# Patient Record
Sex: FEMALE | Race: WHITE | NOT HISPANIC OR LATINO | URBAN - METROPOLITAN AREA
[De-identification: names, ages, dates, MRNs, and addresses within clinical notes are randomized per-mention and may not be internally consistent; named-entity substitution may affect disease eponyms.]

---

## 2021-10-01 ENCOUNTER — OFFICE VISIT (OUTPATIENT)
Dept: PODIATRY | Facility: CLINIC | Age: 66
End: 2021-10-01
Payer: COMMERCIAL

## 2021-10-01 VITALS — WEIGHT: 154 LBS | SYSTOLIC BLOOD PRESSURE: 140 MMHG | HEART RATE: 62 BPM | DIASTOLIC BLOOD PRESSURE: 80 MMHG

## 2021-10-01 DIAGNOSIS — T25.022A BURN OF LEFT FOOT, UNSPECIFIED BURN DEGREE, INITIAL ENCOUNTER: Primary | ICD-10-CM

## 2021-10-01 DIAGNOSIS — M21.622 TAILOR'S BUNION OF LEFT FOOT: ICD-10-CM

## 2021-10-01 PROCEDURE — 99203 OFFICE O/P NEW LOW 30 MIN: CPT | Performed by: PODIATRIST

## 2021-10-01 RX ORDER — ESTRADIOL 2 MG/1
2 TABLET ORAL DAILY
COMMUNITY
Start: 2021-07-30

## 2021-10-01 RX ORDER — ALBUTEROL SULFATE 90 UG/1
AEROSOL, METERED RESPIRATORY (INHALATION)
COMMUNITY
Start: 2021-07-31

## 2021-10-01 NOTE — PATIENT INSTRUCTIONS
We wish you continued good healing. If you have any questions or concerns, please do not hesitate to contact us at [phone]    [employer] (secure e-mail communication and access to your chart) to send a message or to make an appointment.    Please remember to call and schedule a follow up appointment if one was recommended at your earliest convenience.     +++OF MARCH 2020+++ LOCATION AND HOURS HAVE CHANGED    PLEASE CALL CLINICS TO VERIFY DAYS AND TIMES  PODIATRY CLINIC HOURS  TELEPHONE NUMBER    Dr. Miky NELSONPNACHO [facility]        Clinics:  [facility]   Tuesday 1PM-6PM  [place]  Wednesday 745AM-330PM  [place]  Thursday/Friday 745AM-230PM  [place] [facility] APPOINTMENTS  [phone]    [place] APPOINTMENTS  [phone]          If you need a medication refill, please contact us you may need lab work and/or a follow up visit prior to your refill (i.e. Antifungal medications).    If MRI needed please call Imaging at [phone] or [phone]    HOW DO I GET MY KNEE SCOOTER? Knee scooters can be picked up at ANY Medical Supply stores with your knee scooter Prescription.  OR    Bring your signed prescription to an [facility] showroom.

## 2021-10-01 NOTE — LETTER
10/1/2021         RE: Teresa Villarreal  [address]  [place] [address]        Dear Colleague,    Thank you for referring your patient, Teresa Villarreal, to the [facility]. Please see a copy of my visit note below.    Subjective:    Pt is seen today as a new pt referral with the c/c of painful leftfoot.  This has been symptomatic recently.   Patient points to later fifth metatarsal head.    Describes this as a burning pain.  Aggravated by activity and releaved by rest.  Denies ecchymosis edema or erythema.  Also recently dropped hot ceramic on the dorsum of left fourth interspace towards sulcus.  Patient did this on 9/10.  It is now a dry eschar.  Denies drainage or odor.    ROS: See above         Allergies   Allergen Reactions     Azithromycin Other (See Comments)     diarrhea       Current Outpatient Medications   Medication Sig Dispense Refill     albuterol (PROAIR HFA/PROVENTIL HFA/VENTOLIN HFA) 108 (90 Base) MCG/ACT inhaler Inhale 1-2 Puffs every 4 hours as needed for Wheezing       estradiol (ESTRACE) 2 MG tablet Take 2 mg by mouth daily         There is no problem list on file for this patient.      No past medical history on file.    No past surgical history on file.    No family history on file.    Social History     Tobacco Use     Smoking status: Never Smoker     Smokeless tobacco: Never Used   Substance Use Topics     Alcohol use: Never         Exam:    Vitals: BP (!) 140/80   Pulse 62   Wt 69.9 kg (154 lb)   BMI: There is no height or weight on file to calculate BMI.  Height: Data Unavailable    Constitutional/ general:  Pt is in no apparent distress, appears well-nourished.  Cooperative with history and physical exam.     Psych:  The patient answered questions appropriately.  Normal affect.  Seems to have reasonable expectations, in terms of treatment.     Lymphatic:  Popliteal lymph nodes not enlarged.     Lungs:  Non labored breathing, non labored speech. No cough.  No  audible wheezing. Even, quiet breathing.       Vascular:  positive pedal pulses bilaterally for both the DP and PT arteries.  CFT < 3 sec.  negative ankle edema.  positive pedal hair growth.    Neuro:  Alert and oriented x 3. Coordinated gait.  Light touch sensation is intact     Derm: Normal texture and turgor.  No erythema, ecchymosis, or cyanosis.  Small dry eschar noted left fourth interspace dorsal sulcus.  No drainage.  No erythema or pain on palpation.    Musculoskeletal:     Patient is ambulatory without an assistive device or brace.  Slightly pronated arch with weightbearing.   MS 5/5 all compartments.  Normal ROM all forefoot and rearfoot joints.  No equinus.  Mobile forefoot.  Is quite wide with weightbearing.  Pain lateral fifth MTP J.  No pain plantar or dorsal.  No pain with range of motion.  No bursa        A/P leftTailor's bunion         Left foot thermal injury    Discussed with patient burn is healing nicely.  Will just continue to watch this and keep it protected.  Discussed cause of bunionette with patient.  Will wear wide shoes with no seams over this area.  Discussed stretching out shoes in this area.  Discussed releasing shoes to offload this.  Discussed wide running shoe which would accommodate foot well.  RETURN TO CLINIC PRN.    Miky Pascal DPM, FACFAS          Again, thank you for allowing me to participate in the care of your patient.        Sincerely,        Miky Pascal DPM

## 2021-10-01 NOTE — PROGRESS NOTES
Subjective:    Pt is seen today as a new pt referral with the c/c of painful leftfoot.  This has been symptomatic recently.   Patient points to later fifth metatarsal head.    Describes this as a burning pain.  Aggravated by activity and releaved by rest.  Denies ecchymosis edema or erythema.  Also recently dropped hot ceramic on the dorsum of left fourth interspace towards sulcus.  Patient did this on 9/10.  It is now a dry eschar.  Denies drainage or odor.    ROS: See above         Allergies   Allergen Reactions     Azithromycin Other (See Comments)     diarrhea       Current Outpatient Medications   Medication Sig Dispense Refill     albuterol (PROAIR HFA/PROVENTIL HFA/VENTOLIN HFA) 108 (90 Base) MCG/ACT inhaler Inhale 1-2 Puffs every 4 hours as needed for Wheezing       estradiol (ESTRACE) 2 MG tablet Take 2 mg by mouth daily         There is no problem list on file for this patient.      No past medical history on file.    No past surgical history on file.    No family history on file.    Social History     Tobacco Use     Smoking status: Never Smoker     Smokeless tobacco: Never Used   Substance Use Topics     Alcohol use: Never         Exam:    Vitals: BP (!) 140/80   Pulse 62   Wt 69.9 kg (154 lb)   BMI: There is no height or weight on file to calculate BMI.  Height: Data Unavailable    Constitutional/ general:  Pt is in no apparent distress, appears well-nourished.  Cooperative with history and physical exam.     Psych:  The patient answered questions appropriately.  Normal affect.  Seems to have reasonable expectations, in terms of treatment.     Lymphatic:  Popliteal lymph nodes not enlarged.     Lungs:  Non labored breathing, non labored speech. No cough.  No audible wheezing. Even, quiet breathing.       Vascular:  positive pedal pulses bilaterally for both the DP and PT arteries.  CFT < 3 sec.  negative ankle edema.  positive pedal hair growth.    Neuro:  Alert and oriented x 3. Coordinated gait.   Light touch sensation is intact     Derm: Normal texture and turgor.  No erythema, ecchymosis, or cyanosis.  Small dry eschar noted left fourth interspace dorsal sulcus.  No drainage.  No erythema or pain on palpation.    Musculoskeletal:     Patient is ambulatory without an assistive device or brace.  Slightly pronated arch with weightbearing.   MS 5/5 all compartments.  Normal ROM all forefoot and rearfoot joints.  No equinus.  Mobile forefoot.  Is quite wide with weightbearing.  Pain lateral fifth MTP J.  No pain plantar or dorsal.  No pain with range of motion.  No bursa        A/P leftTailor's bunion         Left foot thermal injury    Discussed with patient burn is healing nicely.  Will just continue to watch this and keep it protected.  Discussed cause of bunionette with patient.  Will wear wide shoes with no seams over this area.  Discussed stretching out shoes in this area.  Discussed releasing shoes to offload this.  Discussed wide running shoe which would accommodate foot well.  RETURN TO CLINIC PRN.    Miky Pascal DPM, FACFAS

## 2022-05-19 LAB — HEMOCCULT STL QL IA: NEGATIVE

## 2022-07-21 LAB — AST SERPL-CCNC: 19 U/L (ref 10–40)

## 2022-08-01 LAB — GLUCOSE (EXTERNAL): 101 MG/DL (ref 70–100)

## 2023-06-22 ENCOUNTER — THERAPY VISIT (OUTPATIENT)
Dept: PHYSICAL THERAPY | Facility: CLINIC | Age: 68
End: 2023-06-22
Payer: COMMERCIAL

## 2023-06-22 DIAGNOSIS — M25.562 BILATERAL KNEE PAIN: Primary | ICD-10-CM

## 2023-06-22 DIAGNOSIS — M25.561 BILATERAL KNEE PAIN: Primary | ICD-10-CM

## 2023-06-22 PROCEDURE — 97530 THERAPEUTIC ACTIVITIES: CPT | Mod: GP

## 2023-06-22 PROCEDURE — 97161 PT EVAL LOW COMPLEX 20 MIN: CPT | Mod: GP

## 2023-06-22 PROCEDURE — 97110 THERAPEUTIC EXERCISES: CPT | Mod: 59

## 2023-06-22 ASSESSMENT — ACTIVITIES OF DAILY LIVING (ADL)
SWELLING: I HAVE THE SYMPTOM BUT IT DOES NOT AFFECT MY ACTIVITY
STAND: ACTIVITY IS MINIMALLY DIFFICULT
AS_A_RESULT_OF_YOUR_KNEE_INJURY,_HOW_WOULD_YOU_RATE_YOUR_CURRENT_LEVEL_OF_DAILY_ACTIVITY?: NEARLY NORMAL
LIMPING: I DO NOT HAVE THE SYMPTOM
WALK: ACTIVITY IS NOT DIFFICULT
SIT WITH YOUR KNEE BENT: ACTIVITY IS NOT DIFFICULT
RISE FROM A CHAIR: ACTIVITY IS NOT DIFFICULT
KNEE_ACTIVITY_OF_DAILY_LIVING_SCORE: 90
HOW_WOULD_YOU_RATE_THE_OVERALL_FUNCTION_OF_YOUR_KNEE_DURING_YOUR_USUAL_DAILY_ACTIVITIES?: NEARLY NORMAL
GO DOWN STAIRS: ACTIVITY IS NOT DIFFICULT
RAW_SCORE: 63
GO UP STAIRS: ACTIVITY IS NOT DIFFICULT
KNEE_ACTIVITY_OF_DAILY_LIVING_SUM: 63
PAIN: THE SYMPTOM AFFECTS MY ACTIVITY SLIGHTLY
SQUAT: ACTIVITY IS MINIMALLY DIFFICULT
KNEEL ON THE FRONT OF YOUR KNEE: ACTIVITY IS MINIMALLY DIFFICULT
GIVING WAY, BUCKLING OR SHIFTING OF KNEE: I DO NOT HAVE THE SYMPTOM
HOW_WOULD_YOU_RATE_THE_CURRENT_FUNCTION_OF_YOUR_KNEE_DURING_YOUR_USUAL_DAILY_ACTIVITIES_ON_A_SCALE_FROM_0_TO_100_WITH_100_BEING_YOUR_LEVEL_OF_KNEE_FUNCTION_PRIOR_TO_YOUR_INJURY_AND_0_BEING_THE_INABILITY_TO_PERFORM_ANY_OF_YOUR_USUAL_DAILY_ACTIVITIES?: 90
WEAKNESS: I DO NOT HAVE THE SYMPTOM
STIFFNESS: I HAVE THE SYMPTOM BUT IT DOES NOT AFFECT MY ACTIVITY

## 2023-06-22 NOTE — PROGRESS NOTES
PHYSICAL THERAPY EVALUATION  Type of Visit: Evaluation    See electronic medical record for Abuse and Falls Screening details.    Subjective     Pt presents with B knee pain. Says they started to hurt last June, R was more bothersome than L. Has had Xray, MRI last October, was told she has OA and no cartilage left in her knee. Was told to only stationary bike and walk on a TM. In May she changed her diet to eliminate inflammatory foods, has lost weight. About 10 days ago ran about 3 miles and then felt terrible after (had run a few times previous to this), had been a runner for years. Would like to learn her limits.   Presenting condition or subjective complaint:    Date of onset: 05/01/23 (approximate date)    Relevant medical history:   asthma, hearing problems, OA  Dates & types of surgery:      Prior diagnostic imaging/testing results:       Prior therapy history for the same diagnosis, illness or injury:        Prior Level of Function   Transfers: Independent  Ambulation: Independent  ADL: Independent  IADL: Independent    Living Environment  Social support:   lives alone  Type of home:   apartment/condo  Stairs to enter the home:       yes, 2 w/ railing  Ramp:  no   Stairs inside the home:      Yes, 11 w/ railing   Help at home:  none  Equipment owned:       Employment:    none  Hobbies/Interests:  exercises, languagues, reading, movies    Patient goals for therapy:  walk/run 5 miles    Pain assessment: See objective evaluation for additional pain details     Objective   KNEE EVALUATION  PAIN: Pain Level at Rest: 0/10  Pain Level with Use: 6/10  Pain Location: anterior knees  Pain Quality: Dull  Pain Frequency: intermittent  Pain is Exacerbated By: running, standing for multiple hours  Pain is Relieved By: cold, NSAIDs, rest and stretch  Pain Progression: worsened in the last year, but improved in the last week after running  INTEGUMENTARY (edema, incisions):   POSTURE:   GAIT:  Weightbearing Status:    Assistive Device(s):   Gait Deviations:   BALANCE/PROPRIOCEPTION:   WEIGHTBEARING ALIGNMENT:   NON-WEIGHTBEARING ALIGNMENT:   ROM:   Hip ROM: WNL  Knee ROM:  (Degrees) Left AROM Left PROM  Right AROM Right PROM   Knee Flexion 125  125    Knee Extension 0  0    Pain: no pain at end ranges  End feel:     STRENGTH:    Lower Extremity Muscle Strength   Left Right   Hip Flex 5/5 5/5   Hip Ext 5-/5 5-/5   Hip ER 5/5 5/5   Hip IR 5/5 5/5   Hip ABD 4+/5 4/5   Hip ADD /5 /5   Knee Ext 5/5 5/5   Knee Flex 5/5 5/5     FLEXIBILITY: mild MURPHY hamstring tightness  SPECIAL TESTS: negative patellar compression  FUNCTIONAL TESTS: Double Leg Squat: weight shifted R, anterior knee pain MURPHY  PALPATION: TTP R knee inferior patellar facet, otherwise no TTP  JOINT MOBILITY:     Assessment & Plan   CLINICAL IMPRESSIONS   Medical Diagnosis: self referred    Treatment Diagnosis: Bilateral knee pain   Impression/Assessment: Patient is a 68 year old female with B knee complaints.  The following significant findings have been identified: Pain, Decreased ROM/flexibility, Decreased joint mobility, Decreased strength and Impaired balance. These impairments interfere with their ability to perform self care tasks, work tasks, recreational activities, household chores, household mobility and community mobility as compared to previous level of function.     Clinical Decision Making (Complexity):   Clinical Presentation: Stable/Uncomplicated  Clinical Presentation Rationale: based on medical and personal factors listed in PT evaluation  Clinical Decision Making (Complexity): Low complexity    PLAN OF CARE  Treatment Interventions:  Interventions: Manual Therapy, Neuromuscular Re-education, Therapeutic Activity, Therapeutic Exercise, Self-Care/Home Management    Long Term Goals     PT Goal 1  Goal Identifier: Walking  Goal Description: Pt will be able to walk unrestricted distances w/o increased knee pain  Rationale: to maximize safety and independence  with performance of ADLs and functional tasks;to maximize safety and independence within the home;to maximize safety and independence within the community  Target Date: 09/14/23      Frequency of Treatment: 1x/week  Duration of Treatment: 4 weeks tapering to 2x/month for 8 weeks    Recommended Referrals to Other Professionals: none  Education Assessment:        Risks and benefits of evaluation/treatment have been explained.   Patient/Family/caregiver agrees with Plan of Care.     Evaluation Time:            Signing Clinician: Vicki Waterman PT      [facility]                                                                                   OUTPATIENT PHYSICAL THERAPY      PLAN OF TREATMENT FOR OUTPATIENT REHABILITATION   Patient's Last Name, First Name, Teresa Maynard YOB: 1955   Provider's Name   [facility]   Medical Record No.  [id number]     Onset Date: 05/01/23 (approximate date)  Start of Care Date: 06/22/23     Medical Diagnosis:  self referred      PT Treatment Diagnosis:  Bilateral knee pain Plan of Treatment  Frequency/Duration: 1x/week/ 4 weeks tapering to 2x/month for 8 weeks    Certification date from 06/22/23 to 09/14/23         See note for plan of treatment details and functional goals     Vicki Waterman PT                         I CERTIFY THE NEED FOR THESE SERVICES FURNISHED UNDER        THIS PLAN OF TREATMENT AND WHILE UNDER MY CARE .             Physician Signature               Date    X_____________________________________________________                        Referring Provider:  Referred Self      Initial Assessment  See Epic Evaluation- Start of Care Date: 06/22/23

## 2023-07-12 ENCOUNTER — THERAPY VISIT (OUTPATIENT)
Dept: PHYSICAL THERAPY | Facility: CLINIC | Age: 68
End: 2023-07-12
Payer: COMMERCIAL

## 2023-07-12 DIAGNOSIS — M25.562 BILATERAL KNEE PAIN: Primary | ICD-10-CM

## 2023-07-12 DIAGNOSIS — M25.561 BILATERAL KNEE PAIN: Primary | ICD-10-CM

## 2023-07-12 PROCEDURE — 97110 THERAPEUTIC EXERCISES: CPT | Mod: 59 | Performed by: PHYSICAL THERAPIST

## 2023-07-12 PROCEDURE — 97140 MANUAL THERAPY 1/> REGIONS: CPT | Mod: 59 | Performed by: PHYSICAL THERAPIST

## 2023-07-12 PROCEDURE — 97530 THERAPEUTIC ACTIVITIES: CPT | Mod: GP | Performed by: PHYSICAL THERAPIST

## 2023-07-21 ENCOUNTER — TRANSFERRED RECORDS (OUTPATIENT)
Dept: MULTI SPECIALTY CLINIC | Facility: CLINIC | Age: 68
End: 2023-07-21

## 2023-07-21 LAB
ALT SERPL-CCNC: 17 U/L
CHOLESTEROL (EXTERNAL): 158 MG/DL (ref 0–199)
CREATININE (EXTERNAL): 0.8 MG/DL (ref 0.55–1.02)
GFR ESTIMATED (EXTERNAL): >60 ML/MIN/1.73M2
HDLC SERPL-MCNC: 56 MG/DL
HEMOGLOBIN (EXTERNAL): 5.8 G/DL
LDL CHOLESTEROL CALCULATED (EXTERNAL): 91 MG/DL
NON HDL CHOLESTEROL (EXTERNAL): 102 MG/DL
POTASSIUM (EXTERNAL): 4.9 MMOL/L (ref 3.5–5.1)
TRIGLYCERIDES (EXTERNAL): 54 MG/DL
TSH SERPL-ACNC: 2.03 UIU/ML (ref 0.3–4.5)

## 2023-08-08 ENCOUNTER — THERAPY VISIT (OUTPATIENT)
Dept: PHYSICAL THERAPY | Facility: CLINIC | Age: 68
End: 2023-08-08
Payer: COMMERCIAL

## 2023-08-08 DIAGNOSIS — M25.562 BILATERAL KNEE PAIN: Primary | ICD-10-CM

## 2023-08-08 DIAGNOSIS — M25.561 BILATERAL KNEE PAIN: Primary | ICD-10-CM

## 2023-08-08 PROCEDURE — 97530 THERAPEUTIC ACTIVITIES: CPT | Mod: GP | Performed by: PHYSICAL THERAPIST

## 2023-08-08 PROCEDURE — 97110 THERAPEUTIC EXERCISES: CPT | Mod: 59 | Performed by: PHYSICAL THERAPIST

## 2023-09-04 ENCOUNTER — OFFICE VISIT (OUTPATIENT)
Dept: FAMILY MEDICINE | Facility: CLINIC | Age: 68
End: 2023-09-04
Payer: COMMERCIAL

## 2023-09-04 VITALS
WEIGHT: 142 LBS | OXYGEN SATURATION: 99 % | DIASTOLIC BLOOD PRESSURE: 70 MMHG | RESPIRATION RATE: 16 BRPM | HEART RATE: 62 BPM | SYSTOLIC BLOOD PRESSURE: 111 MMHG | TEMPERATURE: 97.9 F

## 2023-09-04 DIAGNOSIS — U07.1 INFECTION DUE TO 2019 NOVEL CORONAVIRUS: Primary | ICD-10-CM

## 2023-09-04 PROCEDURE — 99204 OFFICE O/P NEW MOD 45 MIN: CPT | Performed by: FAMILY MEDICINE

## 2023-09-04 NOTE — PROGRESS NOTES
Assessment:       Infection due to 2019 novel coronavirus  - nirmatrelvir and ritonavir (PAXLOVID) 300 mg/100 mg therapy pack  Dispense: 30 tablet; Refill: 0         Plan:     Patient with positive COVID-19 test.  Patient high risk complications of COVID-19 because of age.  Will treat with Paxlovid.  No interactions reviewed.  Kidney function reviewed.  All questions answered.    MEDICATIONS:   Orders Placed This Encounter   Medications    nirmatrelvir and ritonavir (PAXLOVID) 300 mg/100 mg therapy pack     Sig: Take 3 tablets by mouth 2 times daily for 5 days (Take 2 Nirmatrelvir tablets and 1 Ritonavir tablet twice daily for 5 days)     Dispense:  30 tablet     Refill:  0     Date of symptom onset: 2; Risk criteria met: Yes; Weight >40 kg Yes; Renal fxn: normal;  Drug-Drug interactions reviewed & addressed: Yes       Subjective:       68 year old female presents for evaluation due to history of cough and low-grade fever.  She tested positive for COVID-19 at home tonight.  She is interested in treatment.  She is otherwise healthy and only takes estradiol.  Kidney function reviewed and was normal in July 2023.  Shortness of breath or wheezing.  No weakness or lightheadedness.    Patient Active Problem List   Diagnosis    Bilateral knee pain       No past medical history on file.    No past surgical history on file.    Current Outpatient Medications   Medication    albuterol (PROAIR HFA/PROVENTIL HFA/VENTOLIN HFA) 108 (90 Base) MCG/ACT inhaler    estradiol (ESTRACE) 2 MG tablet     No current facility-administered medications for this visit.       Allergies   Allergen Reactions    Azithromycin Other (See Comments)     diarrhea       No family history on file.    Social History     Socioeconomic History    Marital status: Single   Tobacco Use    Smoking status: Never    Smokeless tobacco: Never   Substance and Sexual Activity    Alcohol use: Never         Review of Systems  Pertinent items are noted in HPI.       Objective:     There were no vitals taken for this visit.     General appearance: alert, appears stated age, and cooperative  Lungs: clear to auscultation bilaterally  Heart: Regular rate and rhythm          This note has been dictated using voice recognition software. Any grammatical or context distortions are unintentional and inherent to the software

## 2023-10-03 PROBLEM — E87.5 HYPERKALEMIA: Status: ACTIVE | Noted: 2023-08-08

## 2023-10-03 PROBLEM — R73.03 PREDIABETES: Status: ACTIVE | Noted: 2023-08-08

## 2023-10-03 PROBLEM — N62 HYPERTROPHY OF BREAST: Status: ACTIVE | Noted: 2023-08-08

## 2023-10-03 NOTE — PATIENT INSTRUCTIONS
Get the tetanus vaccine at the pharmacy.      Patient Education   Personalized Prevention Plan  You are due for the preventive services outlined below.  Your care team is available to assist you in scheduling these services.  If you have already completed any of these items, please share that information with your care team to update in your medical record.  Health Maintenance Due   Topic Date Due    Osteoporosis Screening  Never done    Discuss Advance Care Planning  Never done    Colorectal Cancer Screening  Never done    Hepatitis C Screening  Never done    Diptheria Tetanus Pertussis (DTAP/TDAP/TD) Vaccine (1 - Tdap) Never done    Cholesterol Lab  Never done    Zoster (Shingles) Vaccine (1 of 2) Never done    Annual Wellness Visit  Never done    FALL RISK ASSESSMENT  Never done    Pneumococcal Vaccine (2 - PCV) 11/12/2022    PHQ-2 (once per calendar year)  Never done    Flu Vaccine (1) 09/01/2023    COVID-19 Vaccine (6 - 2023-24 season) 09/01/2023

## 2023-10-03 NOTE — PROGRESS NOTES
Assessment & Plan     Encounter for Medicare annual wellness exam    Immunizations: Had COVID a month ago, so recommended waiting until three months after the illness for the next booster.  Flu and pneumonia vaccines done today.  Insurance doesn't cover Shingrix - she is considering getting a different plan next year.    Lab Studies: labs recently done over the summer  Mammogram: done in July  Colonoscopy/FIT: FIT ordered  DEXA: declined      Transgender    Teresa saw her previous endocrinologist in August and current dosing of estrogen was continued.  She is s/p orchiectomy so not on any testosterone blockers.  She will follow-up on the hormone therapy next summer; has enough refills until then.  She would like to consider another attempt at breast augmentation (previously had complication with infection and implants were removed); referral placed.      - Comprehensive Gender Care Referral; Future    Special screening for malignant neoplasms, colon    - Fecal colorectal cancer screen (FIT); Future      Benjamin Powers MD  [facility] INTERNAL MEDICINE [place]    Subjective   Teresa is a 68 year old, presenting for the following health issues:  Establish Care (Had COVID about a month ago wants to know when she should get vaccine, discuss breast augmentation )    HPI     Teresa has been on the same dose of estrogen for gender affirmation for about 20 years.  She is s/p orchiectomy.  She would like to get a second opinion on breast augmentation- had augmentation in the past and developed infection after a dental procedure done after the augmentation and the implants were removed and she has been using prosthetics since then, but is contemplating trying augmentation again.      She has a history of prediabetes with A1C of 5.8 and has been doing intermittent fasting to help manage this.          Annual Wellness Visit    Patient has been advised of split billing requirements and indicates understanding: Yes      Are you in the first 12 months of your Medicare Part B coverage?  No    Physical Health questionnaire given to patient but was not able to be found after her appointment, so she may have accidentally taken this home.     Today's PHQ-2 Score:       10/4/2023     3:14 PM   PHQ-2 (  [employer])   Q1: Little interest or pleasure in doing things 0   Q2: Feeling down, depressed or hopeless 0   PHQ-2 Score 0       Have you ever done Advance Care Planning? (For example, a Health Directive, POLST, or a discussion with a medical provider or your loved ones about your wishes)? Deferred to future visit    Fall risk:  Fallen 2 or more times in the past year?: Yes  Any fall with injury in the past year?: No    Cognitive Screenin) Repeat 3 items (Leader, Season, Table)    2) Clock draw: NORMAL  3) 3 item recall: Recalls 3 objects  Results: 3 items recalled: COGNITIVE IMPAIRMENT LESS LIKELY    Mini-CogTM Copyright MEG Barlow. Licensed by the author for use in [facility] [employer]; reprinted with permission (akosua@[facility]). All rights reserved.        Past Medical History:   Diagnosis Date    Bilateral knee pain 2023    Hyperkalemia 2023    Hypertrophy of breast 2023    Prediabetes 2023       Past Surgical History:   Procedure Laterality Date    ORCHIECTOMY SCROTAL Bilateral        Family History   Problem Relation Age of Onset    Breast Cancer Mother     Dementia Mother     Prostate Cancer Father     Crohn's Disease Father     Congenital heart disease Father     Kidney Disease Father         Social History     Tobacco Use    Smoking status: Never    Smokeless tobacco: Never   Substance Use Topics    Alcohol use: Never              No data to display              Do you have a current opioid prescription? No  Do you use any other controlled substances or medications that are not prescribed by a provider? None    Current providers sharing in care for this patient include:   Patient Care Team:  No  "Ref-Primary, Physician as PCP - General    The following health maintenance items are reviewed in Epic and correct as of today:  Health Maintenance   Topic Date Due    DEXA  Never done    ADVANCE CARE PLANNING  Never done    COLORECTAL CANCER SCREENING  Never done    DTAP/TDAP/TD IMMUNIZATION (1 - Tdap) Never done    LIPID  Never done    ZOSTER IMMUNIZATION (1 of 2) Never done    MEDICARE ANNUAL WELLNESS VISIT  Never done    Pneumococcal Vaccine: 65+ Years (2 - PCV) 11/12/2022    INFLUENZA VACCINE (1) 09/01/2023    COVID-19 Vaccine (6 - 2023-24 season) 09/01/2023    FALL RISK ASSESSMENT  10/04/2024    MAMMO SCREENING  07/21/2025    PHQ-2 (once per calendar year)  Completed    IPV IMMUNIZATION  Aged Out    HPV IMMUNIZATION  Aged Out    MENINGITIS IMMUNIZATION  Aged Out    HEPATITIS C SCREENING  Discontinued       Patient has been advised of split billing requirements and indicates understanding: Yes    Appropriate preventive services were discussed with this patient, including applicable screening as appropriate for fall prevention, nutrition, physical activity, Tobacco-use cessation, weight loss and cognition.  Checklist reviewing preventive services available has been given to the patient.      Review of Systems   Constitutional, endo systems are negative, except as otherwise noted.      Objective    /69 (BP Location: Right arm, Patient Position: Sitting, Cuff Size: Adult Regular)   Pulse 67   Ht 1.778 m (5' 10\")   Wt 65.6 kg (144 lb 11.2 oz)   SpO2 97%   BMI 20.76 kg/m    Body mass index is 20.76 kg/m .  Physical Exam     GENERAL: Healthy, alert and no distress  EYES: Eyes grossly normal to inspection.  No discharge or erythema, or obvious scleral/conjunctival abnormalities.  RESP: No audible wheeze, cough, or visible cyanosis.  No visible retractions or increased work of breathing.    SKIN: Visible skin clear. No significant rash, abnormal pigmentation or lesions.  NEURO: Cranial nerves grossly intact. "  Mentation and speech appropriate for age.  PSYCH: Mentation appears normal, affect normal/bright, judgement and insight intact, normal speech and appearance well-groomed.

## 2023-10-04 ENCOUNTER — LAB (OUTPATIENT)
Dept: LAB | Facility: CLINIC | Age: 68
End: 2023-10-04
Payer: COMMERCIAL

## 2023-10-04 ENCOUNTER — OFFICE VISIT (OUTPATIENT)
Dept: INTERNAL MEDICINE | Facility: CLINIC | Age: 68
End: 2023-10-04
Payer: COMMERCIAL

## 2023-10-04 VITALS
DIASTOLIC BLOOD PRESSURE: 69 MMHG | SYSTOLIC BLOOD PRESSURE: 110 MMHG | BODY MASS INDEX: 20.71 KG/M2 | HEIGHT: 70 IN | HEART RATE: 67 BPM | OXYGEN SATURATION: 97 % | WEIGHT: 144.7 LBS

## 2023-10-04 DIAGNOSIS — Z00.00 ENCOUNTER FOR MEDICARE ANNUAL WELLNESS EXAM: Primary | ICD-10-CM

## 2023-10-04 DIAGNOSIS — Z12.11 SPECIAL SCREENING FOR MALIGNANT NEOPLASMS, COLON: ICD-10-CM

## 2023-10-04 DIAGNOSIS — Z78.9 TRANSGENDER: ICD-10-CM

## 2023-10-04 PROCEDURE — 82274 ASSAY TEST FOR BLOOD FECAL: CPT | Performed by: INTERNAL MEDICINE

## 2023-10-04 PROCEDURE — 90662 IIV NO PRSV INCREASED AG IM: CPT | Performed by: INTERNAL MEDICINE

## 2023-10-04 PROCEDURE — 90677 PCV20 VACCINE IM: CPT | Performed by: INTERNAL MEDICINE

## 2023-10-04 PROCEDURE — G0009 ADMIN PNEUMOCOCCAL VACCINE: HCPCS | Performed by: INTERNAL MEDICINE

## 2023-10-04 PROCEDURE — 99213 OFFICE O/P EST LOW 20 MIN: CPT | Mod: 25 | Performed by: INTERNAL MEDICINE

## 2023-10-04 PROCEDURE — G0439 PPPS, SUBSEQ VISIT: HCPCS | Performed by: INTERNAL MEDICINE

## 2023-10-04 PROCEDURE — G0008 ADMIN INFLUENZA VIRUS VAC: HCPCS | Performed by: INTERNAL MEDICINE

## 2023-10-04 PROCEDURE — 99000 SPECIMEN HANDLING OFFICE-LAB: CPT | Performed by: PATHOLOGY

## 2023-10-04 RX ORDER — MULTIPLE VITAMINS W/ MINERALS TAB 9MG-400MCG
1 TAB ORAL DAILY
COMMUNITY

## 2023-10-04 NOTE — NURSING NOTE
Teresa Villarreal is a 68 year old female patient that presents today in clinic for the following:    Chief Complaint   Patient presents with    Establish Care     Had COVID about a month ago wants to know when she should get vaccine, discuss breast augmentation      The patient's allergies and medications were reviewed as noted. A set of vitals were recorded as noted without incident. The patient does not have any other questions for the provider.    Briana Carmichael, EMT at 3:13 PM on 10/4/2023

## 2023-10-05 ENCOUNTER — TELEPHONE (OUTPATIENT)
Dept: PLASTIC SURGERY | Facility: CLINIC | Age: 68
End: 2023-10-05
Payer: COMMERCIAL

## 2023-10-05 NOTE — CONFIDENTIAL NOTE
Writer LAYNE re: referral for CGC. Pt referred for breast augmentation.     Pt called back to discuss referral for breast augmentation. Pt asked for CPT codes so writer gave these. Writer also discussed how to ask insurance about coverage and LOS. Pt stated their insurance doesn't cover psychological services, so writer referred her to [facility] for their sliding scale option. Pt said she was expecting to pay out of pocket, but will look into her insurance plan. Pt to call back to schedule consult.

## 2023-10-07 LAB — HEMOCCULT STL QL IA: NEGATIVE

## 2023-10-10 ENCOUNTER — TELEPHONE (OUTPATIENT)
Dept: PLASTIC SURGERY | Facility: CLINIC | Age: 68
End: 2023-10-10
Payer: COMMERCIAL

## 2023-10-12 ENCOUNTER — DOCUMENTATION ONLY (OUTPATIENT)
Dept: OTHER | Facility: CLINIC | Age: 68
End: 2023-10-12
Payer: COMMERCIAL

## 2024-03-07 ENCOUNTER — DOCUMENTATION ONLY (OUTPATIENT)
Dept: OTHER | Facility: CLINIC | Age: 69
End: 2024-03-07
Payer: COMMERCIAL

## 2024-03-25 ENCOUNTER — TELEPHONE (OUTPATIENT)
Dept: INTERNAL MEDICINE | Facility: CLINIC | Age: 69
End: 2024-03-25
Payer: COMMERCIAL

## 2024-03-25 NOTE — TELEPHONE ENCOUNTER
Left Voicemail (1st Attempt) for the patient to call back and schedule the following:      Additonal Notes: resched 10/4

## 2024-03-26 ENCOUNTER — TELEPHONE (OUTPATIENT)
Dept: INTERNAL MEDICINE | Facility: CLINIC | Age: 69
End: 2024-03-26
Payer: COMMERCIAL

## 2024-03-26 NOTE — TELEPHONE ENCOUNTER
[facility] Call Center    Phone Message    May a detailed message be left on voicemail: yes     Reason for Call: Other: Patient is wondering if she could get an order for mammogram as well as lipid panel before rescheduled appt in August.     Action Taken: Message routed to:  [facility]    Travel Screening: Not Applicable

## 2024-03-28 NOTE — TELEPHONE ENCOUNTER
RN called and spoke to the patient.    The patient states that recently she called the [facility] to schedule an annual physical exam with Dr. Powers (scheduled in August). At that time, the patient was wondering about orders for mammogram and lipid panel as her endocrinologist in the past has recommended these be performed annually.     The RN explained that the patient can discuss needs for routine screening with Dr. Powers at her upcoming appointment and Dr. Powers can place orders at that time. The patient was in agreement with the plan. RN also sent a [employer] activation link to the patient's e mail per patient request.

## 2024-05-12 ENCOUNTER — HEALTH MAINTENANCE LETTER (OUTPATIENT)
Age: 69
End: 2024-05-12

## 2024-08-05 ASSESSMENT — SOCIAL DETERMINANTS OF HEALTH (SDOH): HOW OFTEN DO YOU GET TOGETHER WITH FRIENDS OR RELATIVES?: ONCE A WEEK

## 2024-08-09 ENCOUNTER — OFFICE VISIT (OUTPATIENT)
Dept: INTERNAL MEDICINE | Facility: CLINIC | Age: 69
End: 2024-08-09
Payer: COMMERCIAL

## 2024-08-09 ENCOUNTER — TELEPHONE (OUTPATIENT)
Dept: INTERNAL MEDICINE | Facility: CLINIC | Age: 69
End: 2024-08-09

## 2024-08-09 VITALS
DIASTOLIC BLOOD PRESSURE: 81 MMHG | WEIGHT: 144.8 LBS | HEART RATE: 79 BPM | SYSTOLIC BLOOD PRESSURE: 128 MMHG | HEIGHT: 70 IN | OXYGEN SATURATION: 98 % | BODY MASS INDEX: 20.73 KG/M2

## 2024-08-09 DIAGNOSIS — Z29.11 NEED FOR VACCINATION AGAINST RESPIRATORY SYNCYTIAL VIRUS: ICD-10-CM

## 2024-08-09 DIAGNOSIS — Z13.220 LIPID SCREENING: ICD-10-CM

## 2024-08-09 DIAGNOSIS — Z12.31 VISIT FOR SCREENING MAMMOGRAM: ICD-10-CM

## 2024-08-09 DIAGNOSIS — Z78.9 TRANSGENDER: Primary | ICD-10-CM

## 2024-08-09 DIAGNOSIS — Z23 NEED FOR TDAP VACCINATION: ICD-10-CM

## 2024-08-09 DIAGNOSIS — Z79.818 LONG TERM (CURRENT) USE OF OTHER AGENTS AFFECTING ESTROGEN RECEPTORS AND ESTROGEN LEVELS: ICD-10-CM

## 2024-08-09 DIAGNOSIS — R73.03 PREDIABETES: Primary | ICD-10-CM

## 2024-08-09 DIAGNOSIS — Z13.820 SCREENING FOR OSTEOPOROSIS: ICD-10-CM

## 2024-08-09 PROCEDURE — 99213 OFFICE O/P EST LOW 20 MIN: CPT | Mod: GC | Performed by: INTERNAL MEDICINE

## 2024-08-09 RX ORDER — ESTRADIOL 2 MG/1
2 TABLET ORAL DAILY
Status: CANCELLED | OUTPATIENT
Start: 2024-08-09

## 2024-08-09 ASSESSMENT — SOCIAL DETERMINANTS OF HEALTH (SDOH): HOW OFTEN DO YOU GET TOGETHER WITH FRIENDS OR RELATIVES?: ONCE A WEEK

## 2024-08-09 NOTE — PATIENT INSTRUCTIONS
Schedule a lab appointment in later September or early October and the wellness visit at least 1 week after the lab appointment.  The annual wellness visit should be after October 4th.      Mammogram and bone density test was ordered.  Please call [phone] to schedule.   *    Some insurances only cover some vaccines if you get them at the pharmacy and not in clinic.  You can either check your insurance coverage or just plan to get the following vaccines at the pharmacy: Tetanus and RSV    You can get the following vaccines either in clinic or the pharmacy: COVID

## 2024-08-09 NOTE — TELEPHONE ENCOUNTER
Could someone please call the patient regarding her appointment this afternoon for a wellness visit.  Her last wellness visit was October 4th, 2023, so her insurance likely will not cover another one this early.  She could change the appointment to a problem based visit if she has concerns and reschedule the wellness for October.     I see from a prior message that she wanted orders for a mammogram and cholesterol check, so I did place these orders as well as an A1C recheck.    Thanks,  Benjamin Powers MD    78

## 2024-08-09 NOTE — PROGRESS NOTES
"Assessment & Plan     Need for Tdap vaccination  - Follow-up with pharmacy for vaccination    Need for vaccination against respiratory syncytial virus  - Follow-up with pharmacy for vaccination    Transgender  S/p orchiectomy, on estradiol for 20 years. Currently asymptomatic, will continue estradiol for HRT.  - Estradiol; Future    Visit for screening mammogram  Last received in 2018, currently on estradiol. Due for repeat screen.  - MA Screen with Implants Bilateral w/Jovanny; Future    Screening for osteoporosis  - DEXA HIP/PELVIS/SPINE - Future; Future    Long term (current) use of other agents affecting estrogen receptors and estrogen levels  Currently on estradiol, has not received DEXA scan previously.  - DEXA HIP/PELVIS/SPINE - Future; Future    Subjective   Teresa is a 69 year old, presenting for the following health issues:  Follow Up (Pt would like to discuss estradiol tablet.)      8/9/2024    12:49 PM   Additional Questions   Roomed by Denise WOODALL   Accompanied by N/A     HPI     Patient is a 68 yo F with PMHx of prediabetes, asthma, presenting with a question about her estradiol prescription.    Is hoping to renew her estradiol prescription. Patient is transgender, s/p orchiectomy and has been on estradiol for 20 years. Has had no side effects associated with the medication. Last documented mammogram was 2018 and was normal. Has not received a DEXA scan before. Nonsmoker. No history of hypercoagulability or clotting. Denies change in exercise tolerance, chest pain, SOB, abdominal pain.      Objective    /81 (BP Location: Right arm, Patient Position: Sitting, Cuff Size: Adult Small)   Pulse 79   Ht 1.778 m (5' 10\")   Wt 65.7 kg (144 lb 12.8 oz)   SpO2 98%   BMI 20.78 kg/m    Body mass index is 20.78 kg/m .  Physical Exam  Constitutional:       Appearance: Normal appearance.   HENT:      Head: Normocephalic and atraumatic.   Cardiovascular:      Rate and Rhythm: Normal rate and regular rhythm. "   Pulmonary:      Effort: Pulmonary effort is normal.      Breath sounds: Normal breath sounds.   Neurological:      Mental Status: She is alert and oriented to person, place, and time.   Psychiatric:         Mood and Affect: Mood normal.         Behavior: Behavior normal.            Frank Mendoza MD  PGY-1    Signed Electronically by: Benjamin Powers MD

## 2024-08-09 NOTE — TELEPHONE ENCOUNTER
Spoke w patient regarding visit type of Physical is inside of 1 year and this may not be covered by insurance. Pt agrees to change 8/9/24 visit to Return to discuss a new Rx and will schedule a future Annual visit w Dr. Powers.

## 2024-10-07 ENCOUNTER — LAB (OUTPATIENT)
Dept: LAB | Facility: CLINIC | Age: 69
End: 2024-10-07
Payer: COMMERCIAL

## 2024-10-07 DIAGNOSIS — Z13.220 LIPID SCREENING: ICD-10-CM

## 2024-10-07 DIAGNOSIS — R73.03 PREDIABETES: ICD-10-CM

## 2024-10-07 DIAGNOSIS — Z78.9 TRANSGENDER: ICD-10-CM

## 2024-10-07 LAB
CHOLEST SERPL-MCNC: 192 MG/DL
EST. AVERAGE GLUCOSE BLD GHB EST-MCNC: 117 MG/DL
ESTRADIOL SERPL-MCNC: 24 PG/ML
FASTING STATUS PATIENT QL REPORTED: YES
HBA1C MFR BLD: 5.7 %
HDLC SERPL-MCNC: 99 MG/DL
LDLC SERPL CALC-MCNC: 82 MG/DL
NONHDLC SERPL-MCNC: 93 MG/DL
TRIGL SERPL-MCNC: 56 MG/DL

## 2024-10-07 PROCEDURE — 82670 ASSAY OF TOTAL ESTRADIOL: CPT | Performed by: INTERNAL MEDICINE

## 2024-10-07 PROCEDURE — 80061 LIPID PANEL: CPT | Performed by: INTERNAL MEDICINE

## 2024-10-07 PROCEDURE — 99000 SPECIMEN HANDLING OFFICE-LAB: CPT | Performed by: PATHOLOGY

## 2024-10-07 PROCEDURE — 36415 COLL VENOUS BLD VENIPUNCTURE: CPT | Performed by: PATHOLOGY

## 2024-10-07 PROCEDURE — 83036 HEMOGLOBIN GLYCOSYLATED A1C: CPT | Performed by: INTERNAL MEDICINE

## 2024-10-08 ENCOUNTER — ANCILLARY PROCEDURE (OUTPATIENT)
Dept: MAMMOGRAPHY | Facility: CLINIC | Age: 69
End: 2024-10-08
Attending: INTERNAL MEDICINE
Payer: COMMERCIAL

## 2024-10-08 ENCOUNTER — ANCILLARY PROCEDURE (OUTPATIENT)
Dept: BONE DENSITY | Facility: CLINIC | Age: 69
End: 2024-10-08
Attending: INTERNAL MEDICINE
Payer: COMMERCIAL

## 2024-10-08 DIAGNOSIS — Z12.31 VISIT FOR SCREENING MAMMOGRAM: ICD-10-CM

## 2024-10-08 DIAGNOSIS — Z79.818 LONG TERM (CURRENT) USE OF OTHER AGENTS AFFECTING ESTROGEN RECEPTORS AND ESTROGEN LEVELS: ICD-10-CM

## 2024-10-08 DIAGNOSIS — Z13.820 SCREENING FOR OSTEOPOROSIS: ICD-10-CM

## 2024-10-08 PROCEDURE — 77080 DXA BONE DENSITY AXIAL: CPT | Performed by: INTERNAL MEDICINE

## 2024-10-08 PROCEDURE — 77067 SCR MAMMO BI INCL CAD: CPT | Performed by: STUDENT IN AN ORGANIZED HEALTH CARE EDUCATION/TRAINING PROGRAM

## 2024-10-08 PROCEDURE — 77063 BREAST TOMOSYNTHESIS BI: CPT | Performed by: STUDENT IN AN ORGANIZED HEALTH CARE EDUCATION/TRAINING PROGRAM

## 2024-10-11 SDOH — HEALTH STABILITY: PHYSICAL HEALTH: ON AVERAGE, HOW MANY DAYS PER WEEK DO YOU ENGAGE IN MODERATE TO STRENUOUS EXERCISE (LIKE A BRISK WALK)?: 6 DAYS

## 2024-10-11 SDOH — HEALTH STABILITY: PHYSICAL HEALTH: ON AVERAGE, HOW MANY MINUTES DO YOU ENGAGE IN EXERCISE AT THIS LEVEL?: 150+ MIN

## 2024-10-11 ASSESSMENT — SOCIAL DETERMINANTS OF HEALTH (SDOH): HOW OFTEN DO YOU GET TOGETHER WITH FRIENDS OR RELATIVES?: NEVER

## 2024-10-14 ENCOUNTER — OFFICE VISIT (OUTPATIENT)
Dept: INTERNAL MEDICINE | Facility: CLINIC | Age: 69
End: 2024-10-14
Payer: COMMERCIAL

## 2024-10-14 VITALS
OXYGEN SATURATION: 100 % | SYSTOLIC BLOOD PRESSURE: 139 MMHG | BODY MASS INDEX: 20.91 KG/M2 | HEART RATE: 70 BPM | DIASTOLIC BLOOD PRESSURE: 74 MMHG | WEIGHT: 145.7 LBS

## 2024-10-14 DIAGNOSIS — Z00.00 ENCOUNTER FOR MEDICARE ANNUAL WELLNESS EXAM: Primary | ICD-10-CM

## 2024-10-14 DIAGNOSIS — H61.22 IMPACTED CERUMEN OF LEFT EAR: ICD-10-CM

## 2024-10-14 DIAGNOSIS — Z78.9 TRANSGENDER: ICD-10-CM

## 2024-10-14 PROCEDURE — G0439 PPPS, SUBSEQ VISIT: HCPCS | Performed by: INTERNAL MEDICINE

## 2024-10-14 PROCEDURE — 99213 OFFICE O/P EST LOW 20 MIN: CPT | Mod: 25 | Performed by: INTERNAL MEDICINE

## 2024-10-14 RX ORDER — ESTRADIOL 2 MG/1
2 TABLET ORAL DAILY
Qty: 90 TABLET | Refills: 3 | Status: SHIPPED | OUTPATIENT
Start: 2024-10-14

## 2024-10-14 NOTE — PROGRESS NOTES
Preventive Care Visit  [facility]  Benjamin Powers MD, Internal Medicine  Oct 14, 2024      Assessment & Plan     Encounter for Medicare annual wellness exam    Immunizations: Discussed vaccines that were due.  Teresa unfortunately does not have good insurance coverage for vaccines and will contact the pharmacy to ask about prices.  She wondered about RSV vaccine- no significant risk factors so can wait on this one for now.  Lab Studies: recently completed, has borderline diabetes and we discussed dietary change.  Plan to recheck in 1 year  Mammogram: recently completed and normal  Colonoscopy/FIT: FIT ordered  DEXA:  recently completed and normal  Advanced care planning:  recently completed and normal     Transgender    Recent estrogen level on the low side, but okay for her age group.  Doing well on estradiol tablet, will continue and recheck in 1 year.  S/p orchiectomy.      - estradiol (ESTRACE) 2 MG tablet; Take 1 tablet (2 mg) by mouth daily.    Impacted cerumen of left ear    Teresa reports reduced hearing in the left ear and exam shows occlusion with wax.  She has flushing supplies at home so will attempt flush at home; follow-up if not successful.            Counseling  Appropriate preventive services were addressed with this patient via screening, questionnaire, or discussion as appropriate for fall prevention, nutrition, physical activity, Tobacco-use cessation, social engagement, weight loss and cognition.  Checklist reviewing preventive services available has been given to the patient.  Reviewed patient's diet, addressing concerns and/or questions.   Patient is at risk for social isolation and has been provided with information about the benefit of social connection.   She is at risk for psychosocial distress and has been provided with information to reduce risk.   Discussed possible causes of fatigue. The patient was provided with written information regarding  signs of hearing loss.         Subjective   Teresa is a 69 year old, presenting for the following:  Medicare Visit (Annual medicare wellness. )        10/14/2024    12:25 PM   Additional Questions   Roomed by KTR         Health Care Directive  Patient has a Health Care Directive on file  Advance care planning document is on file and is current.    HPI    Teresa is overall doing well with no concerns.            10/11/2024   General Health   How would you rate your overall physical health? Good   Feel stress (tense, anxious, or unable to sleep) Only a little      (!) STRESS CONCERN      10/11/2024   Nutrition   Diet: Regular (no restrictions)            10/11/2024   Exercise   Days per week of moderate/strenous exercise 6 days   Average minutes spent exercising at this level 150+ min            10/11/2024   Social Factors   Frequency of gathering with friends or relatives Never   Worry food won't last until get money to buy more No   Food not last or not have enough money for food? No   Do you have housing? (Housing is defined as stable permanent housing and does not include staying ouside in a car, in a tent, in an abandoned building, in an overnight shelter, or couch-surfing.) Yes   Are you worried about losing your housing? No   Lack of transportation? No   Unable to get utilities (heat,electricity)? No      (!) SOCIAL CONNECTIONS CONCERN      10/14/2024   Fall Risk   Gait Speed Test (Document in seconds) 2.56   Gait Speed Test Interpretation Less than or equal to 5.00 seconds - PASS             10/11/2024   Activities of Daily Living- Home Safety   Needs help with the following daily activites None of the above   Safety concerns in the home None of the above            10/11/2024   Dental   Dentist two times every year? Yes            10/11/2024   Hearing Screening   Hearing concerns? (!) I NEED TO ASK PEOPLE TO SPEAK UP OR REPEAT THEMSELVES.    (!) TROUBLE UNDERSTANDING SPEECH ON THE TELEPHONE       Multiple  values from one day are sorted in reverse-chronological order         10/11/2024   Driving Risk Screening   Patient/family members have concerns about driving No            10/11/2024   General Alertness/Fatigue Screening   Have you been more tired than usual lately? (!) YES            10/11/2024   Urinary Incontinence Screening   Bothered by leaking urine in past 6 months No            8/5/2024   TB Screening   Were you born outside of the [place]? No            Today's PHQ-2 Score:       10/14/2024    12:21 PM   PHQ-2 ( 1999 [employer])   Q1: Little interest or pleasure in doing things 0   Q2: Feeling down, depressed or hopeless 0   PHQ-2 Score 0   Q1: Little interest or pleasure in doing things Not at all   Q2: Feeling down, depressed or hopeless Not at all   PHQ-2 Score 0           10/11/2024   Substance Use   Alcohol more than 3/day or more than 7/wk Not Applicable   Do you have a current opioid prescription? No   How severe/bad is pain from 1 to 10? 2/10   Do you use any other substances recreationally? No        Social History     Tobacco Use    Smoking status: Never    Smokeless tobacco: Never   Substance Use Topics    Alcohol use: Not Currently    Drug use: Not Currently     Types: Marijuana           10/8/2024   LAST FHS-7 RESULTS   1st degree relative breast or ovarian cancer Yes   Any relative bilateral breast cancer Unknown   Any male have breast cancer No   Any ONE woman have BOTH breast AND ovarian cancer No   Any woman with breast cancer before 50yrs Yes   2 or more relatives with breast AND/OR ovarian cancer No   2 or more relatives with breast AND/OR bowel cancer No           Mammogram Screening - Mammogram every 1-2 years updated in Health Maintenance based on mutual decision making         ASCVD Risk   The 10-year ASCVD risk score (Sherlyn PACE, et al., 2019) is: 8.6%    Values used to calculate the score:      Age: 69 years      Sex: Female      Is Non- : No      Diabetic:  "No      Tobacco smoker: No      Systolic Blood Pressure: 139 mmHg      Is BP treated: No      HDL Cholesterol: 99 mg/dL      Total Cholesterol: 192 mg/dL            Reviewed and updated as needed this visit by Provider     Meds  Problems               Current providers sharing in care for this patient include:  Patient Care Team:  No Ref-Primary, Physician as PCP - Benjamin Bran MD as Assigned PCP    The following health maintenance items are reviewed in Epic and correct as of today:  Health Maintenance   Topic Date Due    DTAP/TDAP/TD IMMUNIZATION (1 - Tdap) Never done    ZOSTER IMMUNIZATION (1 of 2) Never done    COVID-19 Vaccine (6 - 2024-25 season) 09/01/2024    COLORECTAL CANCER SCREENING  10/04/2024    GLUCOSE  08/01/2025    ANNUAL REVIEW OF HM ORDERS  08/09/2025    A1C  10/07/2025    MEDICARE ANNUAL WELLNESS VISIT  10/14/2025    FALL RISK ASSESSMENT  10/14/2025    MAMMO SCREENING  10/08/2026    ADVANCE CARE PLANNING  03/07/2029    LIPID  10/07/2029    RSV VACCINE (1 - 1-dose 75+ series) 05/11/2030    DEXA  10/08/2034    PHQ-2 (once per calendar year)  Completed    INFLUENZA VACCINE  Completed    Pneumococcal Vaccine: 65+ Years  Completed    HPV IMMUNIZATION  Aged Out    MENINGITIS IMMUNIZATION  Aged Out    RSV MONOCLONAL ANTIBODY  Aged Out    HEPATITIS C SCREENING  Discontinued     ROS:  some IBS symptoms but otherwise normal     Objective    Exam  /74 (BP Location: Right arm, Patient Position: Sitting, Cuff Size: Adult Regular)   Pulse 70   Wt 66.1 kg (145 lb 11.2 oz)   SpO2 100%   BMI 20.91 kg/m     Estimated body mass index is 20.91 kg/m  as calculated from the following:    Height as of 8/9/24: 1.778 m (5' 10\").    Weight as of this encounter: 66.1 kg (145 lb 11.2 oz).    Physical Exam  GENERAL: alert and no distress  EYES: Eyes grossly normal to inspection, PERRL and conjunctivae and sclerae normal  HENT: normal cephalic/atraumatic, left ear: occluded with wax, nose and mouth " without ulcers or lesions, oropharynx clear, and oral mucous membranes moist  NECK: no adenopathy, no asymmetry, masses, or scars  RESP: lungs clear to auscultation - no rales, rhonchi or wheezes  CV: regular rate and rhythm, normal S1 S2, no S3 or S4, no murmur, click or rub, no peripheral edema  ABDOMEN: soft, nontender, no hepatosplenomegaly, no masses and bowel sounds normal  MS: no gross musculoskeletal defects noted, no edema  SKIN: no suspicious lesions or rashes  NEURO: Normal strength and tone, mentation intact and speech normal  PSYCH: mentation appears normal, affect normal/bright        10/14/2024   Mini Cog   Clock Draw Score 2 Normal   3 Item Recall 3 objects recalled   Mini Cog Total Score 5                 Signed Electronically by: Benjamin Powers MD

## 2024-10-14 NOTE — PATIENT INSTRUCTIONS
Some insurances only cover some vaccines if you get them at the pharmacy and not in clinic.  You can either check your insurance coverage or just plan to get the following vaccines at the pharmacy: Shingrix (shingles vaccine) and Tetanus    You can get the following vaccines either in clinic or the pharmacy: COVID     For maintenance of ear wax, you can use a home flushing kit (either a bulb or syringe).  Place some wax softening drops (such as Debrox) in the ear for about 5 minutes beforehand.  Depending on how much wax you produce, you can do this monthly, or even weekly, to keep the wax cleaned out.  Avoid sticking anything into the ear canals, including Q-tips.        Patient Education   Preventive Care Advice   This is general advice given by our system to help you stay healthy. However, your care team may have specific advice just for you. Please talk to your care team about your preventive care needs.  Nutrition  Eat 5 or more servings of fruits and vegetables each day.  Try wheat bread, brown rice and whole grain pasta (instead of white bread, rice, and pasta).  Get enough calcium and vitamin D. Check the label on foods and aim for 100% of the RDA (recommended daily allowance).  Lifestyle  Exercise at least 150 minutes each week  (30 minutes a day, 5 days a week).  Do muscle strengthening activities 2 days a week. These help control your weight and prevent disease.  No smoking.  Wear sunscreen to prevent skin cancer.  Have a dental exam and cleaning every 6 months.  Yearly exams  See your health care team every year to talk about:  Any changes in your health.  Any medicines your care team has prescribed.  Preventive care, family planning, and ways to prevent chronic diseases.  Shots (vaccines)   HPV shots (up to age 26), if you've never had them before.  Hepatitis B shots (up to age 59), if you've never had them before.  COVID-19 shot: Get this shot when it's due.  Flu shot: Get a flu shot every  year.  Tetanus shot: Get a tetanus shot every 10 years.  Pneumococcal, hepatitis A, and RSV shots: Ask your care team if you need these based on your risk.  Shingles shot (for age 50 and up)  General health tests  Diabetes screening:  Starting at age 35, Get screened for diabetes at least every 3 years.  If you are younger than age 35, ask your care team if you should be screened for diabetes.  Cholesterol test: At age 39, start having a cholesterol test every 5 years, or more often if advised.  Bone density scan (DEXA): At age 50, ask your care team if you should have this scan for osteoporosis (brittle bones).  Hepatitis C: Get tested at least once in your life.  STIs (sexually transmitted infections)  Before age 24: Ask your care team if you should be screened for STIs.  After age 24: Get screened for STIs if you're at risk. You are at risk for STIs (including HIV) if:  You are sexually active with more than one person.  You don't use condoms every time.  You or a partner was diagnosed with a sexually transmitted infection.  If you are at risk for HIV, ask about PrEP medicine to prevent HIV.  Get tested for HIV at least once in your life, whether you are at risk for HIV or not.  Cancer screening tests  Cervical cancer screening: If you have a cervix, begin getting regular cervical cancer screening tests starting at age 21.  Breast cancer scan (mammogram): If you've ever had breasts, begin having regular mammograms starting at age 40. This is a scan to check for breast cancer.  Colon cancer screening: It is important to start screening for colon cancer at age 45.  Have a colonoscopy test every 10 years (or more often if you're at risk) Or, ask your provider about stool tests like a FIT test every year or Cologuard test every 3 years.  To learn more about your testing options, visit:   .  For help making a decision, visit:   https://bit.ly/ut23685.  Prostate cancer screening test: If you have a prostate, ask your  care team if a prostate cancer screening test (PSA) at age 55 is right for you.  Lung cancer screening: If you are a current or former smoker ages 50 to 80, ask your care team if ongoing lung cancer screenings are right for you.  For informational purposes only. Not to replace the advice of your health care provider. Copyright   2023 [facility]. All rights reserved. Clinically reviewed by the [facility]. [employer] [id number] - REV 01/24.  Preventing Falls: Care Instructions  Injuries and health problems such as trouble walking or poor eyesight can increase your risk of falling. So can some medicines. But there are things you can do to help prevent falls. You can exercise to get stronger. You can also arrange your home to make it safer.    Talk to your doctor about the medicines you take. Ask if any of them increase the risk of falls and whether they can be changed or stopped.   Try to exercise regularly. It can help improve your strength and balance. This can help lower your risk of falling.         Practice fall safety and prevention.   Wear low-heeled shoes that fit well and give your feet good support. Talk to your doctor if you have foot problems that make this hard.  Carry a cellphone or wear a medical alert device that you can use to call for help.  Use stepladders instead of chairs to reach high objects. Don't climb if you're at risk for falls. Ask for help, if needed.  Wear the correct eyeglasses, if you need them.        Make your home safer.   Remove rugs, cords, clutter, and furniture from walkways.  Keep your house well lit. Use night-lights in hallways and bathrooms.  Install and use sturdy handrails on stairways.  Wear nonskid footwear, even inside. Don't walk barefoot or in socks without shoes.        Be safe outside.   Use handrails, curb cuts, and ramps whenever possible.  Keep your hands free by using a shoulder bag or backpack.  Try to walk in well-lit areas.  "Watch out for uneven ground, changes in pavement, and debris.  Be careful in the winter. Walk on the grass or gravel when sidewalks are slippery. Use de-icer on steps and walkways. Add non-slip devices to shoes.    Put grab bars and nonskid mats in your shower or tub and near the toilet. Try to use a shower chair or bath bench when bathing.   Get into a tub or shower by putting in your weaker leg first. Get out with your strong side first. Have a phone or medical alert device in the bathroom with you.   Where can you learn more?  Go to https://www.[employer].net/patiented  Enter G117 in the search box to learn more about \"Preventing Falls: Care Instructions.\"  Current as of: July 17, 2023  Content Version: 14.2 2024 [facility] [employer].   Care instructions adapted under license by your healthcare professional. If you have questions about a medical condition or this instruction, always ask your healthcare professional. [employer] disclaims any warranty or liability for your use of this information.    Hearing Loss: Care Instructions  Overview     Hearing loss is a sudden or slow decrease in how well you hear. It can range from slight to profound. Permanent hearing loss can occur with aging. It also can happen when you are exposed long-term to loud noise. Examples include listening to loud music, riding motorcycles, or being around other loud machines.  Hearing loss can affect your work and home life. It can make you feel lonely or depressed. You may feel that you have lost your independence. But hearing aids and other devices can help you hear better and feel connected to others.  Follow-up care is a key part of your treatment and safety. Be sure to make and go to all appointments, and call your doctor if you are having problems. It's also a good idea to know your test results and keep a list of the medicines you take.  How can you care for yourself at home?  Avoid loud noises whenever possible. " This helps keep your hearing from getting worse.  Always wear hearing protection around loud noises.  Wear a hearing aid as directed.  A professional can help you pick a hearing aid that will work best for you.  You can also get hearing aids over the counter for mild to moderate hearing loss.  Have hearing tests as your doctor suggests. They can show whether your hearing has changed. Your hearing aid may need to be adjusted.  Use other devices as needed. These may include:  Telephone amplifiers and hearing aids that can connect to a television, stereo, radio, or microphone.  Devices that use lights or vibrations. These alert you to the doorbell, a ringing telephone, or a baby monitor.  Television closed-captioning. This shows the words at the bottom of the screen. Most new TVs can do this.  TTY (text telephone). This lets you type messages back and forth on the telephone instead of talking or listening. These devices are also called TDD. When messages are typed on the keyboard, they are sent over the phone line to a receiving TTY. The message is shown on a monitor.  Use text messaging, social media, and email if it is hard for you to communicate by telephone.  Try to learn a listening technique called speechreading. It is not lipreading. You pay attention to people's gestures, expressions, posture, and tone of voice. These clues can help you understand what a person is saying. Face the person you are talking to, and have them face you. Make sure the lighting is good. You need to see the other person's face clearly.  Think about counseling if you need help to adjust to your hearing loss.  When should you call for help?  Watch closely for changes in your health, and be sure to contact your doctor if:    You think your hearing is getting worse.     You have new symptoms, such as dizziness or nausea.   Where can you learn more?  Go to https://www.healthwise.net/patiented  Enter R798 in the search box to learn more about  "\"Hearing Loss: Care Instructions.\"  Current as of: September 27, 2023  Content Version: 14.2 2024 [facility] [employer].   Care instructions adapted under license by your healthcare professional. If you have questions about a medical condition or this instruction, always ask your healthcare professional. [employer] disclaims any warranty or liability for your use of this information.    Learning About Sleeping Well  What does sleeping well mean?     Sleeping well means getting enough sleep to feel good and stay healthy. How much sleep is enough varies among people.  The number of hours you sleep and how you feel when you wake up are both important. If you do not feel refreshed, you probably need more sleep. Another sign of not getting enough sleep is feeling tired during the day.  Experts recommend that adults get at least 7 or more hours of sleep per day. Children and older adults need more sleep.  Why is getting enough sleep important?  Getting enough quality sleep is a basic part of good health. When your sleep suffers, your physical health, mood, and your thoughts can suffer too. You may find yourself feeling more grumpy or stressed. Not getting enough sleep also can lead to serious problems, including injury, accidents, anxiety, and depression.  What might cause poor sleeping?  Many things can cause sleep problems, including:  Changes to your sleep schedule.  Stress. Stress can be caused by fear about a single event, such as giving a speech. Or you may have ongoing stress, such as worry about work or school.  Depression, anxiety, and other mental or emotional conditions.  Changes in your sleep habits or surroundings. This includes changes that happen where you sleep, such as noise, light, or sleeping in a different bed. It also includes changes in your sleep pattern, such as having jet lag or working a late shift.  Health problems, such as pain, breathing problems, and restless legs " "syndrome.  Lack of regular exercise.  Using alcohol, nicotine, or caffeine before bed.  How can you help yourself?  Here are some tips that may help you sleep more soundly and wake up feeling more refreshed.  Your sleeping area   Use your bedroom only for sleeping and sex. A bit of light reading may help you fall asleep. But if it doesn't, do your reading elsewhere in the house. Try not to use your TV, computer, smartphone, or tablet while you are in bed.  Be sure your bed is big enough to stretch out comfortably, especially if you have a sleep partner.  Keep your bedroom quiet, dark, and cool. Use curtains, blinds, or a sleep mask to block out light. To block out noise, use earplugs, soothing music, or a \"white noise\" machine.  Your evening and bedtime routine   Create a relaxing bedtime routine. You might want to take a warm shower or bath, or listen to soothing music.  Go to bed at the same time every night. And get up at the same time every morning, even if you feel tired.  What to avoid   Limit caffeine (coffee, tea, caffeinated sodas) during the day, and don't have any for at least 6 hours before bedtime.  Avoid drinking alcohol before bedtime. Alcohol can cause you to wake up more often during the night.  Try not to smoke or use tobacco, especially in the evening. Nicotine can keep you awake.  Limit naps during the day, especially close to bedtime.  Avoid lying in bed awake for too long. If you can't fall asleep or if you wake up in the middle of the night and can't get back to sleep within about 20 minutes, get out of bed and go to another room until you feel sleepy.  Avoid taking medicine right before bed that may keep you awake or make you feel hyper or energized. Your doctor can tell you if your medicine may do this and if you can take it earlier in the day.  If you can't sleep   Imagine yourself in a peaceful, pleasant scene. Focus on the details and feelings of being in a place that is relaxing.  Get up " "and do a quiet or boring activity until you feel sleepy.  Avoid drinking any liquids before going to bed to help prevent waking up often to use the bathroom.  Where can you learn more?  Go to https://www.[employer].net/patiented  Enter J942 in the search box to learn more about \"Learning About Sleeping Well.\"  Current as of: July 10, 2023  Content Version: 14.2 2024 [facility] [employer], [facility].   Care instructions adapted under license by your healthcare professional. If you have questions about a medical condition or this instruction, always ask your healthcare professional. [employer] disclaims any warranty or liability for your use of this information.       "